# Patient Record
Sex: FEMALE | Race: WHITE | NOT HISPANIC OR LATINO | Employment: OTHER | URBAN - METROPOLITAN AREA
[De-identification: names, ages, dates, MRNs, and addresses within clinical notes are randomized per-mention and may not be internally consistent; named-entity substitution may affect disease eponyms.]

---

## 2019-11-05 PROBLEM — E11.9 TYPE 2 DIABETES MELLITUS, WITHOUT LONG-TERM CURRENT USE OF INSULIN (HCC): Status: ACTIVE | Noted: 2019-11-05

## 2019-11-05 PROBLEM — F31.70 BIPOLAR AFFECTIVE DISORDER IN REMISSION (HCC): Status: ACTIVE | Noted: 2019-11-05

## 2019-11-05 PROBLEM — G62.9 NEUROPATHY: Status: ACTIVE | Noted: 2019-11-05

## 2019-11-05 PROBLEM — F41.9 ANXIETY: Status: ACTIVE | Noted: 2019-11-05

## 2019-11-05 PROBLEM — Z86.69 HISTORY OF GLAUCOMA: Status: ACTIVE | Noted: 2019-11-05

## 2019-11-05 PROBLEM — K21.9 GERD (GASTROESOPHAGEAL REFLUX DISEASE): Status: ACTIVE | Noted: 2019-11-05

## 2019-11-05 PROBLEM — E11.21 DIABETIC NEPHROPATHY ASSOCIATED WITH TYPE 2 DIABETES MELLITUS (HCC): Status: ACTIVE | Noted: 2019-11-05

## 2019-11-05 PROBLEM — E78.2 MIXED HYPERLIPIDEMIA: Status: ACTIVE | Noted: 2019-11-05

## 2019-11-05 PROBLEM — E66.01 MORBID OBESITY (HCC): Status: ACTIVE | Noted: 2019-11-05

## 2019-11-05 PROBLEM — Z85.3 HISTORY OF BREAST CANCER: Status: ACTIVE | Noted: 2019-11-05

## 2019-11-05 PROBLEM — Z99.3 USES WHEELCHAIR: Status: ACTIVE | Noted: 2019-11-05

## 2019-11-05 PROBLEM — Z90.11 HISTORY OF RIGHT MASTECTOMY: Status: ACTIVE | Noted: 2019-11-05

## 2019-11-05 PROBLEM — Z98.890 HISTORY OF LAMINECTOMY: Status: ACTIVE | Noted: 2019-11-05

## 2019-11-05 PROBLEM — E03.9 ACQUIRED HYPOTHYROIDISM: Status: ACTIVE | Noted: 2019-11-05

## 2019-11-05 PROBLEM — R26.2 DISABILITY OF WALKING: Status: ACTIVE | Noted: 2019-11-05

## 2019-11-05 PROBLEM — I25.10 CORONARY ARTERY DISEASE INVOLVING NATIVE HEART: Status: ACTIVE | Noted: 2019-11-05

## 2020-01-09 PROBLEM — K59.00 CONSTIPATION: Status: ACTIVE | Noted: 2020-01-09

## 2021-04-15 ENCOUNTER — TELEPHONE (OUTPATIENT)
Dept: ENDOCRINOLOGY | Facility: MEDICAL CENTER | Age: 71
End: 2021-04-15

## 2021-04-15 NOTE — TELEPHONE ENCOUNTER
VOICEMAIL  1. Caller Name: Tina from [facility]                      Call Back Number: [phone]    2. 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      3. Patient approves office to leave a detailed voicemail/MyChart message: yes    I contacted [facility]. I asked to speak with Tina. They stated they had no one working from there and were not able to assist me. I was told they would research who called and would call us back.